# Patient Record
Sex: MALE | Race: BLACK OR AFRICAN AMERICAN | URBAN - METROPOLITAN AREA
[De-identification: names, ages, dates, MRNs, and addresses within clinical notes are randomized per-mention and may not be internally consistent; named-entity substitution may affect disease eponyms.]

---

## 2023-09-14 ENCOUNTER — OFFICE VISIT (OUTPATIENT)
Age: 53
End: 2023-09-14
Payer: OTHER GOVERNMENT

## 2023-09-14 VITALS
HEART RATE: 101 BPM | DIASTOLIC BLOOD PRESSURE: 96 MMHG | WEIGHT: 315 LBS | TEMPERATURE: 97.7 F | SYSTOLIC BLOOD PRESSURE: 134 MMHG

## 2023-09-14 DIAGNOSIS — E66.01 MORBID OBESITY (HCC): ICD-10-CM

## 2023-09-14 DIAGNOSIS — K42.0 INCARCERATED UMBILICAL HERNIA: Primary | ICD-10-CM

## 2023-09-14 PROCEDURE — 99204 OFFICE O/P NEW MOD 45 MIN: CPT | Performed by: SURGERY

## 2023-09-14 RX ORDER — CHLORTHALIDONE 25 MG/1
25 TABLET ORAL
COMMUNITY
Start: 2023-08-31

## 2023-09-14 RX ORDER — LOSARTAN POTASSIUM 50 MG/1
50 TABLET ORAL
COMMUNITY
Start: 2021-03-25

## 2023-09-14 RX ORDER — ALBUTEROL SULFATE 90 UG/1
AEROSOL, METERED RESPIRATORY (INHALATION)
COMMUNITY
Start: 2023-08-31

## 2023-09-14 RX ORDER — BENZONATATE 100 MG/1
CAPSULE ORAL
COMMUNITY
Start: 2023-09-13

## 2023-09-14 NOTE — PROGRESS NOTES
Sherif Gutierrez is a 52 y.o. male (: 1970) presenting to address:    Chief Complaint   Patient presents with    New Patient     Umbilical hernia/referred by [facility]       Medication list and allergies have been reviewed with Sherif Gutierrez and updated as of today's date. I have gone over all Medical, Surgical and Social History with Sherif Gutierrez and updated/added the information accordingly.
equal, round, reactive to light   Throat & Neck: normal, no erythema or exudates noted. , and no palpable masses   Lungs:   clear to auscultation bilaterally   Heart:  Regular rate and rhythm   Abdomen:   rounded, obese, and protuberant, soft, nontender, nondistended, no masses or organomegaly. There is an incarcerated umbilical hernia that is tender    Extremities: extremities normal, atraumatic, no cyanosis or edema   Skin: Normal.       Imaging and Lab Review:     CBC: No results found for: \"WBC\", \"RBC\", \"HGB\", \"HCT\", \"PLT\"  BMP: No results found for: \"GLU\", \"NA\", \"K\", \"CL\", \"CO2\", \"BUN\", \"CREA\", \"CA\"  CMP:No results found for: \"GLU\", \"NA\", \"K\", \"CL\", \"CO2\", \"BUN\", \"CREA\", \"CA\", \"AGAP\", \"TP\", \"ALB\", \"GLOB\"    No results found for this or any previous visit (from the past 24 hour(s)). images and reports reviewed    Assessment:   Efren Mckeon is a 52 y.o. male who is presenting with a picture of incarcerated umbilical hernia. I Discussed the possibility of strangulation, enlargement in size over time, and the risk of emergency surgery in the face of strangulation. I also discussed the use of prosthetic materials (mesh), including the risk of infection. Also discussed the risk of surgery including recurrence and the possible need for reoperation and removal of mesh if used, possibility of postoperative small bowel injury, obstruction or ileus, and the risks of general anesthetic. I explained to the the patient about the robotic hernia repair procedure. Given that the patient is morbidly obese and he is not very interested in surgery he wants to wait and try to lose weight which I understand and I agree. He understand that his hernia is symptomatic and it could lead to bowel obstruction or strangulation but we decided to wait and he is also considering the weight loss program which I encouraged him to do.     Plan:     Lose weight  Consider joining the weight loss program  Close observation of his incarcerated